# Patient Record
Sex: FEMALE | Employment: FULL TIME
[De-identification: names, ages, dates, MRNs, and addresses within clinical notes are randomized per-mention and may not be internally consistent; named-entity substitution may affect disease eponyms.]

---

## 2021-10-30 ENCOUNTER — TELEPHONE (OUTPATIENT)
Dept: SCHEDULING | Age: 41
End: 2021-10-30

## 2025-03-04 ENCOUNTER — HOSPITAL ENCOUNTER (OUTPATIENT)
Age: 45
Discharge: HOME | End: 2025-03-04
Payer: COMMERCIAL

## 2025-03-04 VITALS
SYSTOLIC BLOOD PRESSURE: 122 MMHG | RESPIRATION RATE: 19 BRPM | HEART RATE: 71 BPM | OXYGEN SATURATION: 100 % | DIASTOLIC BLOOD PRESSURE: 77 MMHG | TEMPERATURE: 99 F

## 2025-03-04 DIAGNOSIS — J06.9 VIRAL URI WITH COUGH: Primary | ICD-10-CM

## 2025-03-04 LAB
POCT INFLUENZA A: NEGATIVE
POCT INFLUENZA B: NEGATIVE
S PYO AG THROAT QL IA.RAPID: NEGATIVE
SARS-COV-2 RNA RESP QL NAA+PROBE: NOT DETECTED

## 2025-03-04 PROCEDURE — 99203 OFFICE O/P NEW LOW 30 MIN: CPT

## 2025-03-04 PROCEDURE — 87502 INFLUENZA DNA AMP PROBE: CPT | Performed by: NURSE PRACTITIONER

## 2025-03-04 PROCEDURE — 87651 STREP A DNA AMP PROBE: CPT | Performed by: NURSE PRACTITIONER

## 2025-03-04 RX ORDER — BENZONATATE 100 MG/1
100 CAPSULE ORAL 3 TIMES DAILY PRN
Qty: 30 CAPSULE | Refills: 0 | Status: SHIPPED | OUTPATIENT
Start: 2025-03-04 | End: 2025-03-14

## 2025-03-04 NOTE — ED PROVIDER NOTES
Patient Seen in: [facility]      History     Chief Complaint   Patient presents with    Sore Throat     Stated Complaint: SoreThroat    Subjective: This is a 44-year-old male, no significant past medical history, presents to immediate care clinic for sore throat, chills, body aches, headache, cough, congestion since Sunday.  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  She denies fevers.  Low-grade temp on arrival.  Patient reports she was wheezing this morning, although, none heard on lung auscultation.  She does not communicate chest pain, dizziness, lightheadedness, palpitations, shortness of breath.  She has been taking Advil to moderate alleviation of symptoms.  She is well-appearing.  Speaking in complete full sentences without difficulty.  AOx4.  The history is provided by the patient.           Objective:     No pertinent past medical history.            No pertinent past surgical history.              No pertinent social history.            Review of Systems   Constitutional:  Positive for activity change and chills. Negative for appetite change, fatigue and fever.   HENT:  Positive for congestion, postnasal drip, rhinorrhea and sore throat. Negative for ear discharge, ear pain, sinus pressure, sinus pain and sneezing.    Eyes: Negative.    Respiratory:  Positive for cough and wheezing. Negative for chest tightness, shortness of breath and stridor.    Cardiovascular: Negative.    Gastrointestinal: Negative.    Musculoskeletal: Negative.    Skin: Negative.    Neurological:  Positive for headaches. Negative for dizziness, syncope and weakness.       Positive for stated complaint: SoreThroat  Other systems are as noted in HPI.  Constitutional and vital signs reviewed.      All other systems reviewed and negative except as noted above.    Physical Exam     ED Triage Vitals [03/04/25 0859]   /77   Pulse 71   Resp 19   Temp 99 °F (37.2 °C)   Temp src Oral   SpO2 100 %    O2 Device None (Room air)       Current Vitals:   Vital Signs  BP: 122/77  Pulse: 71  Resp: 19  Temp: 99 °F (37.2 °C)  Temp src: Oral    Oxygen Therapy  SpO2: 100 %  O2 Device: None (Room air)        Physical Exam  Constitutional:       General: She is not in acute distress.     Appearance: Normal appearance. She is not ill-appearing or toxic-appearing.   HENT:      Head: Normocephalic.      Jaw: There is normal jaw occlusion.      Right Ear: Tympanic membrane, ear canal and external ear normal.      Left Ear: There is impacted cerumen.      Nose: Congestion present.      Mouth/Throat:      Lips: Pink. No lesions.      Mouth: Mucous membranes are moist. No oral lesions.      Dentition: No gum lesions.      Tongue: No lesions.      Palate: No lesions.      Pharynx: Uvula midline. Postnasal drip present. No oropharyngeal exudate, posterior oropharyngeal erythema or uvula swelling.   Eyes:      Extraocular Movements: Extraocular movements intact.      Pupils: Pupils are equal, round, and reactive to light.   Cardiovascular:      Rate and Rhythm: Normal rate.      Pulses: Normal pulses.      Heart sounds: Normal heart sounds. No murmur heard.  Pulmonary:      Effort: Pulmonary effort is normal. No respiratory distress.      Breath sounds: Normal breath sounds. No stridor. No wheezing, rhonchi or rales.   Chest:      Chest wall: No tenderness.   Musculoskeletal:         General: Normal range of motion.      Cervical back: Normal range of motion.   Lymphadenopathy:      Cervical: Cervical adenopathy present.   Skin:     General: Skin is warm.      Capillary Refill: Capillary refill takes less than 2 seconds.   Neurological:      General: No focal deficit present.      Mental Status: She is alert and oriented to person, place, and time.             ED Course     Labs Reviewed   RAPID STREP A - Normal   RAPID SARS-COV-2 BY PCR - Normal   POCT FLU TEST - Normal    Narrative:     This assay is a rapid molecular in vitro  test utilizing nucleic acid amplification of influenza A and B viral RNA.                   MDM      Differentials considered include: COVID-19, strep pharyngitis, viral pharyngitis, viral URI, influenza A, influenza B.    Patient posterior pharynx with postnasal drip.  No tonsillar enlargement, edema, exudate.  Uvula midline and normal in size.  Mucous remains moist.  No intraoral lesions petechiae.  + cervical lymphadenopathy  Patient is tolerating her own secretions well without difficulty.  No excessive drooling, stridor, voice change.  No evidence of peritonsillar abscess.    Patient is negative for strep throat.    Her lungs are clear on examination.  No wheezing, stridor, coarse or diminished breath sounds or crackles.  No evidence of pneumonia or bronchitis.    Patient is negative for COVID 19. Will test for Influenza. Patient is agreeable.   Patient is negative for influenza A and influenza B.    Likely viral URI.  Cough suppressant prescribed, benzonatate.    Patient is aware of supportive and symptomatic management at home.  She is aware of typical length and duration respiratory viruses lasting 2 to 3 weeks with cough being the last symptom to resolve.    Patient is aware if she does not feel any better in 7 to 10 days to follow-up with primary care doctor.    Patient is aware of signs symptoms that warrant immediate ER evaluation.          Medical Decision Making      Disposition and Plan     Clinical Impression:  1. Viral URI with cough         Disposition:  Discharge  3/4/2025  9:36 am    Follow-up:  Ferny Schofield  [address]  [address]  [facility] [id number] [phone]    In 7 days  If symptoms worsen          Medications Prescribed:  Discharge Medication List as of 3/4/2025  9:36 AM        START taking these medications    Details   benzonatate 100 MG Oral Cap Take 1 capsule (100 mg total) by mouth 3 (three) times daily as needed for cough., Normal, Disp-30 capsule, R-0                  Supplementary Documentation:

## 2025-03-04 NOTE — DISCHARGE INSTRUCTIONS
You are negative for COVID-19, influenza A, influenza B, strep throat.  Symptoms are likely viral in nature.  Your lungs are clear on exam, no evidence of pneumonia.    We have not seen these respiratory viruses last about 2 to 3 weeks.  Cough is usually last symptom to go away.  Cough may last 3 to 4 weeks.  I prescribed a cough suppressant that you can take up to 3 times a day.    Recommend that you drink plenty water electrolytes, get plenty of rest, sleep elevated and upright, sleep with humidifier, steam showers for cough and congestion.  Recommend 2 teaspoons of honey at bedtime to help suppress the cough.    Flonase and Sudafed as needed for any congestion.    Follow-up with your primary care doctor in 7 to 10 days if you not feel any improvement.    Please go to emergency room if you experience any chest pain, dizziness, lightheadedness, palpitations, shortness of breath.

## (undated) NOTE — LETTER
Date & Time: 3/4/2025, 9:39 AM  Patient: Olegario Díaz  Encounter Provider(s):    Luh Lindo APRN       To Whom It May Concern:    Olegario Díaz was seen and treated in our department on 3/4/2025. She should not return to work until Thursday, March 6th 2025 .    If you have any questions or concerns, please do not hesitate to call.        _____________________________  Physician/APC Signature